# Patient Record
Sex: FEMALE | NOT HISPANIC OR LATINO | URBAN - METROPOLITAN AREA
[De-identification: names, ages, dates, MRNs, and addresses within clinical notes are randomized per-mention and may not be internally consistent; named-entity substitution may affect disease eponyms.]

---

## 2017-07-18 ENCOUNTER — IMPORTED ENCOUNTER (OUTPATIENT)
Dept: URBAN - METROPOLITAN AREA CLINIC 31 | Facility: CLINIC | Age: 62
End: 2017-07-18

## 2017-07-18 PROBLEM — H04.123: Noted: 2017-07-18

## 2017-07-18 PROBLEM — M06.9: Noted: 2017-07-18

## 2017-07-18 PROBLEM — H25.013: Noted: 2017-07-18

## 2017-07-18 PROBLEM — Z79.899: Noted: 2017-07-18

## 2017-07-18 PROBLEM — H43.22: Noted: 2017-07-18

## 2017-07-18 PROCEDURE — 92310 CONTACT LENS FITTING OU: CPT

## 2017-07-18 PROCEDURE — 92083 EXTENDED VISUAL FIELD XM: CPT

## 2017-07-18 PROCEDURE — 92015 DETERMINE REFRACTIVE STATE: CPT

## 2017-07-18 PROCEDURE — 92014 COMPRE OPH EXAM EST PT 1/>: CPT

## 2017-07-18 PROCEDURE — 92250 FUNDUS PHOTOGRAPHY W/I&R: CPT

## 2017-07-18 NOTE — PATIENT DISCUSSION
1.  [place] - Pt currently on oral plaquenil therapy. No signs of retinopathy based on todays dilated exam and Visual Field. Continue to follow with prescribing medical professional.2. Dry Eye OU:  Continue current management with Artificial Tears. 3.  Asteroid Hyalosis OS - 4. Cataract OU: Explained how cataracts can effect vision. Recommend clinical observation. The patient was advised to contact us if any change or worsening of vision. 5. Rheumatoid Arthritis - Continue to follow with PCP/Rheumatologist/Neurologist.6. Cont with current cls and rx. Total Dailies one 2.75 ou Dist ou. Using OTC over cls. Eval 90.  7.  RTN 1 yr CE/VF and color plates.

## 2018-09-11 ENCOUNTER — IMPORTED ENCOUNTER (OUTPATIENT)
Dept: URBAN - METROPOLITAN AREA CLINIC 31 | Facility: CLINIC | Age: 63
End: 2018-09-11

## 2018-09-11 PROBLEM — H25.813: Noted: 2018-09-11

## 2018-09-11 PROBLEM — Z79.899: Noted: 2018-09-11

## 2018-09-11 PROBLEM — H43.22: Noted: 2018-09-11

## 2018-09-11 PROBLEM — H25.013: Noted: 2018-09-11

## 2018-09-11 PROBLEM — M06.9: Noted: 2018-09-11

## 2018-09-11 PROBLEM — H04.123: Noted: 2018-09-11

## 2018-09-11 PROCEDURE — 92015 DETERMINE REFRACTIVE STATE: CPT

## 2018-09-11 PROCEDURE — 92310 CONTACT LENS FITTING OU: CPT

## 2018-09-11 PROCEDURE — 92250 FUNDUS PHOTOGRAPHY W/I&R: CPT

## 2018-09-11 PROCEDURE — 92014 COMPRE OPH EXAM EST PT 1/>: CPT

## 2018-09-11 NOTE — PATIENT DISCUSSION
1. Combined Types of Cataract OU:   Large shifts in visions OD>OS----Explained how cataracts can effect vision. Recommend clinical observation. The patient was advised to contact us if any change or worsening of vision. 2. [place] - Pt currently on oral plaquenil therapy. Been on it for 20 yrs. No signs of retinopathy based on todays dilated exam and Visual Field. Continue to follow with prescribing medical professional.3. Dry Eye OU:  Continue current management with Artificial Tears. 4.  Asteroid Hyalosis OS - 5. Rheumatoid Arthritis - Continue to follow with PCP/Rheumatologist/Neurologist.6. Change rx  Total Dailies one 0.75/1.25 Dist ou. Using OTC over cls. Eval 90.  7.  May defer gls rx change until we know if pt is stable. 8.  RTN 6 mths DFTA/OCT MAC and VF color plates. 9.  RTN 1 yr CE/VF and color plates.

## 2019-03-06 ENCOUNTER — IMPORTED ENCOUNTER (OUTPATIENT)
Dept: URBAN - METROPOLITAN AREA CLINIC 31 | Facility: CLINIC | Age: 64
End: 2019-03-06

## 2019-03-06 PROBLEM — H25.813: Noted: 2019-03-06

## 2019-03-06 PROBLEM — H04.123: Noted: 2019-03-06

## 2019-03-06 PROBLEM — H43.22: Noted: 2019-03-06

## 2019-03-06 PROBLEM — H25.013: Noted: 2019-03-06

## 2019-03-06 PROBLEM — Z79.899: Noted: 2019-03-06

## 2019-03-06 PROBLEM — M06.9: Noted: 2019-03-06

## 2019-03-06 PROCEDURE — 99214 OFFICE O/P EST MOD 30 MIN: CPT

## 2019-03-06 PROCEDURE — 92083 EXTENDED VISUAL FIELD XM: CPT

## 2019-03-06 PROCEDURE — V2520 CONTACT LENS HYDROPHILIC: HCPCS

## 2019-03-06 PROCEDURE — 92250 FUNDUS PHOTOGRAPHY W/I&R: CPT

## 2019-03-06 NOTE — PATIENT DISCUSSION
1. Combined Types of Cataract OU:   Large shifts in visions OD>OS----Explained how cataracts can effect vision. Recommend clinical observation. The patient was advised to contact us if any change or worsening of vision. 2. [place] - Pt currently on oral plaquenil therapy. Been on it for 20 yrs. No signs of retinopathy based on todays dilated exam and Visual Field. Continue to follow with prescribing medical professional.3. Dry Eye OU:  Continue current management with Artificial Tears. 4.  Asteroid Hyalosis OS - 5. Rheumatoid Arthritis - Continue to follow with PCP/Rheumatologist/Neurologist.6. Change rx  Total Dailies one 0.75/1.25 Dist ou. Wearing [employer]. Using OTC over cls. Eval 90.  7.  May defer gls rx change until we know if pt is stable. 8.  RTN 6 mths CE/VF and color plates.

## 2019-09-06 ENCOUNTER — IMPORTED ENCOUNTER (OUTPATIENT)
Dept: URBAN - METROPOLITAN AREA CLINIC 31 | Facility: CLINIC | Age: 64
End: 2019-09-06

## 2019-11-01 ENCOUNTER — IMPORTED ENCOUNTER (OUTPATIENT)
Dept: URBAN - METROPOLITAN AREA CLINIC 31 | Facility: CLINIC | Age: 64
End: 2019-11-01

## 2019-11-01 PROBLEM — H43.22: Noted: 2019-11-01

## 2019-11-01 PROBLEM — H04.123: Noted: 2019-11-01

## 2019-11-01 PROBLEM — H25.013: Noted: 2019-11-01

## 2019-11-01 PROBLEM — H25.813: Noted: 2019-11-01

## 2019-11-01 PROBLEM — M06.9: Noted: 2019-11-01

## 2019-11-01 PROBLEM — Z79.899: Noted: 2019-11-01

## 2019-11-01 NOTE — PATIENT DISCUSSION
1. Combined Types of Cataract OU:   Large shifts in visions OD>OS----Explained how cataracts can effect vision. Recommend clinical observation. The patient was advised to contact us if any change or worsening of vision. 2. [place] - Pt currently on oral plaquenil therapy. Been on it for 20 yrs. No signs of retinopathy based on todays dilated exam and Visual Field. Continue to follow with prescribing medical professional.3. Dry Eye OU:  Continue current management with Artificial Tears. 4.  Asteroid Hyalosis OS - 5. Rheumatoid Arthritis - Continue to follow with PCP/Rheumatologist/Neurologist.6. Change rx  Total Dailies one 0.75/1.25 Dist ou. Wearing [employer]. Using OTC over cls. Eval 90.  7.  May defer gls rx change until we know if pt is stable. 8.  RTN 6 mths CE/VF and color plates.

## 2022-04-02 ASSESSMENT — VISUAL ACUITY
OS_SC: 20/30-1
OD_CC: 20/100
OS_SC: 20/30+3
OS_CC: 20/50
OD_SC: 20/30-1
OD_SC: 20/30-1

## 2022-04-02 ASSESSMENT — TONOMETRY
OS_IOP_MMHG: 17
OS_IOP_MMHG: 16
OD_IOP_MMHG: 16
OS_IOP_MMHG: 18
OD_IOP_MMHG: 18
OD_IOP_MMHG: 16